# Patient Record
Sex: MALE | Race: WHITE | Employment: OTHER | URBAN - METROPOLITAN AREA
[De-identification: names, ages, dates, MRNs, and addresses within clinical notes are randomized per-mention and may not be internally consistent; named-entity substitution may affect disease eponyms.]

---

## 2023-11-20 ENCOUNTER — OFFICE VISIT (OUTPATIENT)
Dept: PULMONOLOGY | Age: 78
End: 2023-11-20
Payer: MEDICARE

## 2023-11-20 VITALS
SYSTOLIC BLOOD PRESSURE: 138 MMHG | BODY MASS INDEX: 34.72 KG/M2 | DIASTOLIC BLOOD PRESSURE: 68 MMHG | RESPIRATION RATE: 18 BRPM | OXYGEN SATURATION: 96 % | TEMPERATURE: 97.6 F | HEIGHT: 71 IN | HEART RATE: 70 BPM | WEIGHT: 248 LBS

## 2023-11-20 DIAGNOSIS — R06.02 SOB (SHORTNESS OF BREATH): Primary | ICD-10-CM

## 2023-11-20 DIAGNOSIS — G47.33 OSA (OBSTRUCTIVE SLEEP APNEA): ICD-10-CM

## 2023-11-20 DIAGNOSIS — G47.39 TREATMENT-EMERGENT CENTRAL SLEEP APNEA: ICD-10-CM

## 2023-11-20 DIAGNOSIS — R05.3 CHRONIC COUGH: ICD-10-CM

## 2023-11-20 DIAGNOSIS — G47.31 COMPLEX SLEEP APNEA SYNDROME: ICD-10-CM

## 2023-11-20 PROCEDURE — G8417 CALC BMI ABV UP PARAM F/U: HCPCS | Performed by: INTERNAL MEDICINE

## 2023-11-20 PROCEDURE — 1123F ACP DISCUSS/DSCN MKR DOCD: CPT | Performed by: INTERNAL MEDICINE

## 2023-11-20 PROCEDURE — 99214 OFFICE O/P EST MOD 30 MIN: CPT | Performed by: INTERNAL MEDICINE

## 2023-11-20 PROCEDURE — G8427 DOCREV CUR MEDS BY ELIG CLIN: HCPCS | Performed by: INTERNAL MEDICINE

## 2023-11-20 PROCEDURE — 1036F TOBACCO NON-USER: CPT | Performed by: INTERNAL MEDICINE

## 2023-11-20 PROCEDURE — G8484 FLU IMMUNIZE NO ADMIN: HCPCS | Performed by: INTERNAL MEDICINE

## 2023-11-20 RX ORDER — CYCLOBENZAPRINE HCL 10 MG
10 TABLET ORAL 3 TIMES DAILY PRN
COMMUNITY

## 2023-11-20 ASSESSMENT — ENCOUNTER SYMPTOMS
GASTROINTESTINAL NEGATIVE: 1
EYES NEGATIVE: 1
ALLERGIC/IMMUNOLOGIC NEGATIVE: 1
RESPIRATORY NEGATIVE: 1

## 2023-11-20 NOTE — PATIENT INSTRUCTIONS
ASSESSMENT/PLAN:   Diagnosis Orders   1. SOB (shortness of breath)        2. PATY (obstructive sleep apnea)        3. Complex sleep apnea syndrome        4. Chronic cough        5. Treatment-emergent central sleep apnea          Cough:  Was from [facility]  Now off this  Better    7/2017: PFT and Ct scan of chest were normal  Spirometry was normal  FENO was 12 ppb, normal      Complex sleep apnea                        bipap asv max pres of 25, epap max of 18 and min of 13 and ps max of 12 and 0 and 12 bpm, ti of 1.2    Using 29/30 days  Using 7.7 hr  ahi is 1.8  No pb  90% epap is 14.1  Ave ps of 4.0  Full face mask      dme is patient aids  Can not find the machine on care   Will need to bring machine    The PAP therapy is not working properly. It is too old to repair or replace the parts. The patient will need new PAP therapy. The patient has been feeling benefit from PAP therapy and will need to continue. Cpap/Bipap is too old to fix and is not functioning properly  Patient has benefited from PAP use but now needs a new one  It is beyond repair and will need new replacement  Patient can not do without pap therapy       Needs supplies      Insomnia  ambien 5 mg once a day, given by Dr. Genny Jacobson    Need to take 1 hour before bedtime  And no TV, computer, smart phone one hour before bedtime  And dim lights in the house 1 hour before bedtime      Lung nodule  Ct scan done at [facility] in 9/2017  6 mm pulmonary nodule right lower lobe as described. Follow-up  recommended as described. Ct scan done 4/2018  5.5 mm right lower lobe pulmonary nodule essentially stable since August 20, 2013, consistent with a benign nodule. No significant acute findings. No further ct scans for the lung nodule      Shortness of breath  CPET done 1/2019    CONCLUSION:   Abnormal CPET.  Test interpretation difficult as of short duration, patient artifact may have affected final values and premature test

## 2023-11-20 NOTE — PROGRESS NOTES
MA Communication:   The following orders are received by verbal communication from Devonte Zamarripa MD    Orders include:  orders to DME, 31-90
worsens. Reviewed proper inhaler usage                                   [address] [facility]  [address]  [address] [address] [place] [id number]  Dept: [phone]  Loc: [phone]   Diagnosis:  [] PATY (ICD-10: G47.33)  o CSA (ICD-10: G47.31)  [x] Complex Sleep Apnea (ICD-10: G47.37)  []  (ICD-10: G47.37)  [] Hypoxemia (ICD-10: R09.02)  [] COPD (J44.90)  [] Chronic Respiratory Failure with hypoxemia (J96.11)  [] Chronicr Respiratory Failure with hypercapnia (J96.12)  [] Restrictive Lung Disease (J98.4)    [x] New Rx (Device Preference: ____ASV Auto_____________________)     [] Change Order     [] I have Changed the following:      [] Please make the change  [x] Replace ___________old machine not working  [] Clinical assessments and may include IN-Check device, spirometry and ETCO2 PRN    [] Discontinue Order -  [] CPAP    [] BPAP    [] PAP    [] Oxygen   /   AMA?  [] Yes   [] No              Therapy    AHI: ________ SHANIA: ________  LOW SpO2: ________%      DME:patient aids/legacy  Device/Settings Comfort/Other Orders Interface   []  CPAP () _____    []  BiLevel PAP ()  _______  []  BiLevel PAP with   ()  __________      []  BiLevel PAP with Backup Rate ()    _________   Ramp: _________ min's  [] Ramp to patient preference   [] Adjust FLEX to patient comfort    General PAP Supply Kit  Medicaid does not cover heated tubing  ([employer])  PAP Tubing:  [x] Heated ()- 1/3 mos                         [x] Regular () - 1/3 mos  [x] Disposable Water Chamber () - 1/6 mos  [x] Disposable Filter () - 2/mo  [x] Non-Disposable Filter () - 1/6 mos   []  AutoPAP ()  Max_____  Min_____  []  AutoBiLevel Pressure  ()  IPAP max____  EPAP min ____  PS____ SUPPLEMENTAL OXYGEN  [] OXYGEN:      Liter/min: _________  [] Continuous         [] Nocturnal  [] Bleed into PAP Device Mask Interface

## 2024-02-19 ENCOUNTER — OFFICE VISIT (OUTPATIENT)
Dept: PULMONOLOGY | Age: 79
End: 2024-02-19
Payer: MEDICARE

## 2024-02-19 VITALS
WEIGHT: 250.6 LBS | BODY MASS INDEX: 35.08 KG/M2 | SYSTOLIC BLOOD PRESSURE: 162 MMHG | RESPIRATION RATE: 16 BRPM | HEIGHT: 71 IN | TEMPERATURE: 97.3 F | DIASTOLIC BLOOD PRESSURE: 69 MMHG | HEART RATE: 64 BPM | OXYGEN SATURATION: 95 %

## 2024-02-19 DIAGNOSIS — R05.3 CHRONIC COUGH: ICD-10-CM

## 2024-02-19 DIAGNOSIS — G47.31 COMPLEX SLEEP APNEA SYNDROME: ICD-10-CM

## 2024-02-19 DIAGNOSIS — G47.33 OSA (OBSTRUCTIVE SLEEP APNEA): ICD-10-CM

## 2024-02-19 DIAGNOSIS — R06.02 SOB (SHORTNESS OF BREATH): ICD-10-CM

## 2024-02-19 DIAGNOSIS — G47.39 TREATMENT-EMERGENT CENTRAL SLEEP APNEA: Primary | ICD-10-CM

## 2024-02-19 PROCEDURE — 1036F TOBACCO NON-USER: CPT | Performed by: INTERNAL MEDICINE

## 2024-02-19 PROCEDURE — G8417 CALC BMI ABV UP PARAM F/U: HCPCS | Performed by: INTERNAL MEDICINE

## 2024-02-19 PROCEDURE — G8427 DOCREV CUR MEDS BY ELIG CLIN: HCPCS | Performed by: INTERNAL MEDICINE

## 2024-02-19 PROCEDURE — 1123F ACP DISCUSS/DSCN MKR DOCD: CPT | Performed by: INTERNAL MEDICINE

## 2024-02-19 PROCEDURE — 99214 OFFICE O/P EST MOD 30 MIN: CPT | Performed by: INTERNAL MEDICINE

## 2024-02-19 PROCEDURE — G8484 FLU IMMUNIZE NO ADMIN: HCPCS | Performed by: INTERNAL MEDICINE

## 2024-02-19 ASSESSMENT — SLEEP AND FATIGUE QUESTIONNAIRES
HOW LIKELY ARE YOU TO NOD OFF OR FALL ASLEEP WHILE SITTING AND READING: 3
HOW LIKELY ARE YOU TO NOD OFF OR FALL ASLEEP WHEN YOU ARE A PASSENGER IN A CAR FOR AN HOUR WITHOUT A BREAK: 2
HOW LIKELY ARE YOU TO NOD OFF OR FALL ASLEEP WHILE SITTING INACTIVE IN A PUBLIC PLACE: 0
HOW LIKELY ARE YOU TO NOD OFF OR FALL ASLEEP WHILE LYING DOWN TO REST IN THE AFTERNOON WHEN CIRCUMSTANCES PERMIT: 1
HOW LIKELY ARE YOU TO NOD OFF OR FALL ASLEEP WHILE SITTING QUIETLY AFTER LUNCH WITHOUT ALCOHOL: 0
HOW LIKELY ARE YOU TO NOD OFF OR FALL ASLEEP WHILE WATCHING TV: 3
ESS TOTAL SCORE: 9
HOW LIKELY ARE YOU TO NOD OFF OR FALL ASLEEP WHILE SITTING AND TALKING TO SOMEONE: 0
HOW LIKELY ARE YOU TO NOD OFF OR FALL ASLEEP IN A CAR, WHILE STOPPED FOR A FEW MINUTES IN TRAFFIC: 0

## 2024-02-19 ASSESSMENT — ENCOUNTER SYMPTOMS
GASTROINTESTINAL NEGATIVE: 1
ALLERGIC/IMMUNOLOGIC NEGATIVE: 1
EYES NEGATIVE: 1
RESPIRATORY NEGATIVE: 1

## 2024-02-19 NOTE — PROGRESS NOTES
Melida Borja (: 1945 ) is a 78 y.o. male here for an evaluation of   Chief Complaint   Patient presents with    Sleep Apnea     Patient is here for a 31-90 day follow up         ASSESSMENT/PLAN:   Diagnosis Orders   1. Treatment-emergent central sleep apnea        2. PATY (obstructive sleep apnea)        3. Complex sleep apnea syndrome        4. SOB (shortness of breath)        5. Chronic cough          Cough:  Was from [facility]  Now off this  [facility]    2017: PFT and Ct scan of chest were normal  Spirometry was normal  FENO was 12 ppb, normal      Complex sleep apnea                        bipap asv max pres of 25, epap max of 18 and min of 13 and ps max of 12 and 0 and 12 bpm, ti of 1.2      Autopap set up on 1/15/2024  [facility] patient aids    SN : [phone]    ASV Auto is set at EPAP min of 13 and EPAP max of 15  and PS min  of 3 and max of 10  Using 100 % of time  Using 7.5 hr/night  Mask type full    Ramp 15 with start at 4    Mv is 9.0  Tv of 155  90% pressure is 20.0/13.7  Leak at 6.3 lpm  Ahi is 0.7    The patient is feeling the benefit of PAP therapy  We will continue with PAP therapy    I have access via  [facility]          Insomnia  ambien 5 mg once a day, given by Dr. Chandler Smith    Need to take 1 hour before bedtime  And no TV, computer, smart phone one hour before bedtime  And dim lights in the house 1 hour before bedtime      Lung nodule  Ct scan done at [facility] in 2017  6 mm pulmonary nodule right lower lobe as described. Follow-up  recommended as described.    Ct scan done 2018  5.5 mm right lower lobe pulmonary nodule essentially stable since 2013, consistent with a benign nodule.    No significant acute findings.    No further ct scans for the lung nodule      Shortness of breath  CPET done 2019    CONCLUSION:   Abnormal CPET. Test interpretation difficult as of short duration, patient artifact may have affected final values and premature test termination

## 2024-02-19 NOTE — PATIENT INSTRUCTIONS
ASSESSMENT/PLAN:   Diagnosis Orders   1. Treatment-emergent central sleep apnea        2. PATY (obstructive sleep apnea)        3. Complex sleep apnea syndrome        4. SOB (shortness of breath)        5. Chronic cough          Cough:  Was from [facility]  Now off this  [facility]    7/2017: PFT and Ct scan of chest were normal  Spirometry was normal  FENO was 12 ppb, normal      Complex sleep apnea                        bipap asv max pres of 25, epap max of 18 and min of 13 and ps max of 12 and 0 and 12 bpm, ti of 1.2      Autopap set up on 1/15/2024  Dme patient aids    SN : [phone]    ASV Auto is set at EPAP min of 13 and EPAP max of 15  and PS min  of 3 and max of 10  Using 100 % of time  Using 7.5 hr/night  Mask type full    Ramp 15 with start at 4    Mv is 9.0  Tv of 155  90% pressure is 20.0/13.7  Leak at 6.3 lpm  Ahi is 0.7    The patient is feeling the benefit of PAP therapy  We will continue with PAP therapy    I have access via  [employer]          Insomnia  ambien 5 mg once a day, given by Dr. Chandler Smith    Need to take 1 hour before bedtime  And no TV, computer, smart phone one hour before bedtime  And dim lights in the house 1 hour before bedtime      Lung nodule  Ct scan done at [facility] in 9/2017  6 mm pulmonary nodule right lower lobe as described. Follow-up  recommended as described.    Ct scan done 4/2018  5.5 mm right lower lobe pulmonary nodule essentially stable since August 20, 2013, consistent with a benign nodule.    No significant acute findings.    No further ct scans for the lung nodule      Shortness of breath  CPET done 1/2019    CONCLUSION:   Abnormal CPET. Test interpretation difficult as of short duration, patient artifact may have affected final values and premature test termination makes any conclusion difficult. At test termination no definite ventilatory or gas exchange abnormalities.Reduced peak VO2 and AT in the setting of  abnormal slope VE/VCO2 to peak VO2